# Patient Record
Sex: FEMALE | Race: WHITE | NOT HISPANIC OR LATINO | URBAN - METROPOLITAN AREA
[De-identification: names, ages, dates, MRNs, and addresses within clinical notes are randomized per-mention and may not be internally consistent; named-entity substitution may affect disease eponyms.]

---

## 2023-05-15 ENCOUNTER — TELEPHONE (OUTPATIENT)
Dept: FAMILY MEDICINE | Facility: CLINIC | Age: 43
End: 2023-05-15
Payer: MEDICAID

## 2023-05-15 NOTE — TELEPHONE ENCOUNTER
Ms. Lancaster has been having chest pains on and off for a few weeks.  They are bad today.  I advised her to go to the ER.

## 2023-05-15 NOTE — TELEPHONE ENCOUNTER
----- Message from Nikkie Craig sent at 5/15/2023 10:23 AM CDT -----  Regarding: sooner apt  Contact: patient  Type: Needs Medical Advice  Who Called:  Patient  Symptoms (please be specific):  chest pain  How long has patient had these symptoms:    Pharmacy name and phone #:    Best Call Back Number: [phone] (home)     Additional Information: Please call to schedule thanks!